# Patient Record
Sex: MALE | Race: OTHER | URBAN - METROPOLITAN AREA
[De-identification: names, ages, dates, MRNs, and addresses within clinical notes are randomized per-mention and may not be internally consistent; named-entity substitution may affect disease eponyms.]

---

## 2021-01-12 ENCOUNTER — IMPORTED ENCOUNTER (OUTPATIENT)
Dept: URBAN - METROPOLITAN AREA CLINIC 50 | Facility: CLINIC | Age: 81
End: 2021-01-12

## 2021-02-01 ENCOUNTER — IMPORTED ENCOUNTER (OUTPATIENT)
Dept: URBAN - METROPOLITAN AREA CLINIC 50 | Facility: CLINIC | Age: 81
End: 2021-02-01

## 2021-02-05 ENCOUNTER — IMPORTED ENCOUNTER (OUTPATIENT)
Dept: URBAN - METROPOLITAN AREA CLINIC 50 | Facility: CLINIC | Age: 81
End: 2021-02-05

## 2021-02-08 ENCOUNTER — IMPORTED ENCOUNTER (OUTPATIENT)
Dept: URBAN - METROPOLITAN AREA CLINIC 50 | Facility: CLINIC | Age: 81
End: 2021-02-08

## 2021-02-15 ENCOUNTER — IMPORTED ENCOUNTER (OUTPATIENT)
Dept: URBAN - METROPOLITAN AREA CLINIC 50 | Facility: CLINIC | Age: 81
End: 2021-02-15

## 2021-04-12 ENCOUNTER — IMPORTED ENCOUNTER (OUTPATIENT)
Dept: URBAN - METROPOLITAN AREA CLINIC 50 | Facility: CLINIC | Age: 81
End: 2021-04-12

## 2021-04-17 ASSESSMENT — VISUAL ACUITY
OS_CC: J3
OD_SC: 20/30
OD_CC: J3
OD_CC: 20/40
OD_CC: 20/40
OD_CC: 20/40 SLOW

## 2021-04-17 ASSESSMENT — TONOMETRY
OS_IOP_MMHG: 31
OS_IOP_MMHG: 40
OD_IOP_MMHG: 15
OD_IOP_MMHG: 14
OS_IOP_MMHG: 29
OS_IOP_MMHG: 23
OS_IOP_MMHG: 24
OD_IOP_MMHG: 14
OS_IOP_MMHG: 27
OD_IOP_MMHG: 13
OS_IOP_MMHG: 37
OD_IOP_MMHG: 14
OS_IOP_MMHG: 28
OD_IOP_MMHG: 16
OD_IOP_MMHG: 14
OD_IOP_MMHG: 15
OS_IOP_MMHG: 30
OS_IOP_MMHG: 39
OS_IOP_MMHG: 38
OD_IOP_MMHG: 12
OD_IOP_MMHG: 13
OS_IOP_MMHG: 27

## 2021-04-17 ASSESSMENT — PACHYMETRY
OS_CT_UM: 557
OS_CT_UM: 557
OD_CT_UM: 562
OS_CT_UM: 557
OD_CT_UM: 562
OS_CT_UM: 557
OD_CT_UM: 562
OS_CT_UM: 557
OD_CT_UM: 562
OS_CT_UM: 557
OD_CT_UM: 562
OS_CT_UM: 557
OD_CT_UM: 562

## 2021-11-02 ENCOUNTER — PREPPED CHART (OUTPATIENT)
Dept: URBAN - METROPOLITAN AREA CLINIC 52 | Facility: CLINIC | Age: 81
End: 2021-11-02

## 2021-11-08 ENCOUNTER — 6 MONTH FOLLOW-UP (OUTPATIENT)
Dept: URBAN - METROPOLITAN AREA CLINIC 52 | Facility: CLINIC | Age: 81
End: 2021-11-08

## 2021-11-08 DIAGNOSIS — B02.32: ICD-10-CM

## 2021-11-08 DIAGNOSIS — H40.1122: ICD-10-CM

## 2021-11-08 PROCEDURE — 92012 INTRM OPH EXAM EST PATIENT: CPT

## 2021-11-08 RX ORDER — LATANOPROST 50 UG/ML
1 SOLUTION/ DROPS OPHTHALMIC EVERY EVENING
Start: 2021-11-08

## 2021-11-08 RX ORDER — KETOROLAC TROMETHAMINE 4 MG/ML: 1 SOLUTION/ DROPS OPHTHALMIC

## 2021-11-08 ASSESSMENT — TONOMETRY
OD_IOP_MMHG: 15
OS_IOP_MMHG: 38
OD_IOP_MMHG: 16
OS_IOP_MMHG: 37

## 2021-11-08 ASSESSMENT — VISUAL ACUITY: OD_CC: 20/40-2

## 2021-11-08 NOTE — PATIENT DISCUSSION
Recommend patient stop rocklatan to see if it is causing inflammation.  start latanoprost at bedtime left eye,  continue dorzolamide-timolol twice a day in the left eye.  will recheck in 2 weeks.

## 2021-11-22 ENCOUNTER — 2 WEEK FOLLOW-UP (OUTPATIENT)
Dept: URBAN - METROPOLITAN AREA CLINIC 52 | Facility: CLINIC | Age: 81
End: 2021-11-22

## 2021-11-22 DIAGNOSIS — H40.1122: ICD-10-CM

## 2021-11-22 DIAGNOSIS — H40.042: ICD-10-CM

## 2021-11-22 PROCEDURE — 92012 INTRM OPH EXAM EST PATIENT: CPT

## 2021-11-22 PROCEDURE — 92020 GONIOSCOPY: CPT

## 2021-11-22 RX ORDER — BRIMONIDINE TARTRATE 2 MG/MG
1 SOLUTION/ DROPS OPHTHALMIC
Start: 2021-11-22

## 2021-11-22 RX ORDER — ACETAZOLAMIDE 250 MG/1
1 TABLET ORAL
Start: 2021-11-22

## 2021-11-22 RX ORDER — KETOROLAC TROMETHAMINE 4 MG/ML
1 SOLUTION/ DROPS OPHTHALMIC ONCE A DAY
Start: 2021-11-22

## 2021-11-22 ASSESSMENT — VISUAL ACUITY: OD_CC: 20/40+2

## 2021-11-22 ASSESSMENT — TONOMETRY
OD_IOP_MMHG: 12
OS_IOP_MMHG: 30
OD_IOP_MMHG: 13
OS_IOP_MMHG: 29

## 2021-11-22 NOTE — PATIENT DISCUSSION
IOP above target range. IOP 30, OS. Patient is NLP OS today. Rechecked by Dr. Asher Ames in exam room. Patient denies any pain. Slight irritation from the Latanoprost. Advised patient to continue Latanoprost and Dorzolamide/Timolol. Will start patient on Brimonidine 0.2% 1gtt TID OS. Will also start patient on Acetazolamide 250mg TID by mouth. Patient to decrease Ketorolac to 1gtt QD OS. Will send letter to [facility]. Patient previously seen Dr. Macario Pineda at the beginning of the year.  Will see patient back in one week for IOP check.

## 2021-11-29 ENCOUNTER — 1 WEEK FOLLOW-UP (OUTPATIENT)
Dept: URBAN - METROPOLITAN AREA CLINIC 52 | Facility: CLINIC | Age: 81
End: 2021-11-29

## 2021-11-29 DIAGNOSIS — H40.1122: ICD-10-CM

## 2021-11-29 DIAGNOSIS — H40.042: ICD-10-CM

## 2021-11-29 DIAGNOSIS — B02.32: ICD-10-CM

## 2021-11-29 DIAGNOSIS — H53.40: ICD-10-CM

## 2021-11-29 PROCEDURE — 92012 INTRM OPH EXAM EST PATIENT: CPT

## 2021-11-29 ASSESSMENT — TONOMETRY
OD_IOP_MMHG: 10
OD_IOP_MMHG: 11

## 2021-11-29 ASSESSMENT — VISUAL ACUITY: OD_CC: 20/30-1

## 2021-11-29 NOTE — PATIENT DISCUSSION
IOP is below target range. IOP 06, OS. Patient is NLP OS today. Rechecked by Dr. Lyudmila Duncan in exam room. Patient denies any pain. Advised patient to continue Latanoprost and Dorzolamide/Timolol,  stop Brimonidine 0.2% 1gtt TID OS., Stop  Acetazolamide 250mg TID by mouth. Patient to continue Ketorolac to 1gtt QD OS.  Patient previously seen Dr. Nica Durant at the beginning of the year.  will recheck iop in 1 week.

## 2021-11-29 NOTE — PATIENT DISCUSSION
Patient had an episode of a jerking left hand very pronounced this morning.  that has since subsided.

## 2021-11-29 NOTE — PATIENT DISCUSSION
based on NLP, very low iop,  extreme hand shaking that subsided this has subsided it is recommended that patient have MRI Head.  order given to patient.

## 2021-11-29 NOTE — PATIENT DISCUSSION
It is recommended patient have an MRI head in view of APD and low iop and jerking hand movements .  visual acuity did not improve with lowering iop.

## 2021-11-29 NOTE — PATIENT DISCUSSION
The patient is a steroid responder. Steroid risks emphasized to patient today. steroid response has worn off.

## 2021-12-06 ENCOUNTER — FOLLOW UP (OUTPATIENT)
Dept: URBAN - METROPOLITAN AREA CLINIC 52 | Facility: CLINIC | Age: 81
End: 2021-12-06

## 2021-12-06 DIAGNOSIS — H40.1122: ICD-10-CM

## 2021-12-06 DIAGNOSIS — H53.40: ICD-10-CM

## 2021-12-06 DIAGNOSIS — H40.042: ICD-10-CM

## 2021-12-06 DIAGNOSIS — B02.32: ICD-10-CM

## 2021-12-06 PROCEDURE — 92012 INTRM OPH EXAM EST PATIENT: CPT

## 2021-12-06 RX ORDER — BRIMONIDINE TARTRATE 2 MG/MG: 1 SOLUTION/ DROPS OPHTHALMIC

## 2021-12-06 ASSESSMENT — TONOMETRY
OD_IOP_MMHG: 15
OD_IOP_MMHG: 14
OS_IOP_MMHG: 25
OS_IOP_MMHG: 26

## 2021-12-06 ASSESSMENT — VISUAL ACUITY: OD_CC: 20/30+

## 2021-12-06 NOTE — PATIENT DISCUSSION
Patient had an episode of a jerking left hand very pronounced the morning of the last visit.  that has since subsided.

## 2021-12-06 NOTE — PATIENT DISCUSSION
IOP is above target range. IOP 26, OS. Patient is still NLP OS today. Rechecked by Dr. Milton Manning in exam room. Patient denies any pain. Advised patient to continue Latanoprost and Dorzolamide/Timolol,   still hold Acetazolamide 250mg TID by mouth secondary to heavy side effects.  Extensive discussion with patient and wife regarding treatment possibilities with referral to Dr Kade Cole or adding brimonidine back into treatment plan.  Patient and spouse have decided not to take the referral to Dr Kade Cole or additional drop therapy at this time secondary to no pain or vision at this time.   Patient to continue Ketorolac to 1gtt QD OS.  Patient previously seen Dr. Kade Cole at the beginning of the year. Discussed with patient to call if any pain or changes in symptoms occur.

## 2022-03-14 ENCOUNTER — FOLLOW UP (OUTPATIENT)
Dept: URBAN - METROPOLITAN AREA CLINIC 52 | Facility: CLINIC | Age: 82
End: 2022-03-14

## 2022-03-14 DIAGNOSIS — H40.1122: ICD-10-CM

## 2022-03-14 PROCEDURE — 92012 INTRM OPH EXAM EST PATIENT: CPT

## 2022-03-14 ASSESSMENT — TONOMETRY
OS_IOP_MMHG: 28
OD_IOP_MMHG: 15
OS_IOP_MMHG: 27
OD_IOP_MMHG: 16

## 2022-03-14 ASSESSMENT — VISUAL ACUITY: OD_CC: 20/30

## 2022-03-14 NOTE — PATIENT DISCUSSION
IOP is above target range. IOP 26, OS. Patient is still NLP OS today. Rechecked by Dr. John Luke in exam room. Patient denies any pain. Advised patient to continue Latanoprost and Dorzolamide/Timolol,   still hold Acetazolamide 250mg TID by mouth secondary to heavy side effects.  Extensive discussion with patient and wife regarding treatment possibilities with referral to Dr Kayli Berumen or adding brimonidine back into treatment plan.  Patient and spouse have decided not to take the referral to Dr Kayli Berumen or additional drop therapy at this time secondary to no pain or vision at this time.   Patient to continue Ketorolac to 1gtt QD OS.  Patient previously seen Dr. Kayli Berumen at the beginning of the year. Discussed with patient to call if any pain or changes in symptoms occur.

## 2022-11-14 ENCOUNTER — FOLLOW UP (OUTPATIENT)
Dept: URBAN - METROPOLITAN AREA CLINIC 52 | Facility: CLINIC | Age: 82
End: 2022-11-14

## 2022-11-14 DIAGNOSIS — H53.40: ICD-10-CM

## 2022-11-14 DIAGNOSIS — B02.32: ICD-10-CM

## 2022-11-14 DIAGNOSIS — H40.1122: ICD-10-CM

## 2022-11-14 PROCEDURE — 92012 INTRM OPH EXAM EST PATIENT: CPT

## 2022-11-14 ASSESSMENT — TONOMETRY
OS_IOP_MMHG: 28
OS_IOP_MMHG: 28
OD_IOP_MMHG: 11
OD_IOP_MMHG: 10

## 2022-11-14 ASSESSMENT — VISUAL ACUITY: OD_CC: 20/40

## 2022-11-14 NOTE — PATIENT DISCUSSION
IOP is above target range. IOP 28 OS. Patient is still NLP OS today. Patient denies any pain. Discussed with patient continue Latanoprost as preventive to prevent pain. with  Advised patient to continue Latanoprost and Dorzolamide/Timolol,   still hold Acetazolamide 250mg TID by mouth secondary to heavy side effects.  Patient to continue Ketorolac to 1gtt QD OS.  Patient previously seen Dr. Mahsa Augustine at the beginning of the year. Discussed with patient to call if any pain or changes in symptoms occur. RTC in 6 months for.

## 2023-04-25 ENCOUNTER — COMPREHENSIVE EXAM (OUTPATIENT)
Dept: URBAN - METROPOLITAN AREA CLINIC 53 | Facility: CLINIC | Age: 83
End: 2023-04-25

## 2023-04-25 DIAGNOSIS — H40.1122: ICD-10-CM

## 2023-04-25 DIAGNOSIS — H26.491: ICD-10-CM

## 2023-04-25 DIAGNOSIS — H35.361: ICD-10-CM

## 2023-04-25 DIAGNOSIS — H35.372: ICD-10-CM

## 2023-04-25 DIAGNOSIS — S05.02XA: ICD-10-CM

## 2023-04-25 PROCEDURE — 92014 COMPRE OPH EXAM EST PT 1/>: CPT

## 2023-04-25 RX ORDER — ERYTHROMYCIN 5 MG/G: OINTMENT OPHTHALMIC EVERY EVENING

## 2023-04-25 ASSESSMENT — TONOMETRY
OS_IOP_MMHG: 28
OS_IOP_MMHG: 27
OD_IOP_MMHG: 15
OD_IOP_MMHG: 14

## 2023-04-25 ASSESSMENT — VISUAL ACUITY
OD_PH: 20/30
OU_CC: J1+
OD_CC: 20/30

## 2024-01-22 ENCOUNTER — FOLLOW UP (OUTPATIENT)
Dept: URBAN - METROPOLITAN AREA CLINIC 52 | Facility: CLINIC | Age: 84
End: 2024-01-22

## 2024-01-22 DIAGNOSIS — H40.1122: ICD-10-CM

## 2024-01-22 DIAGNOSIS — H26.491: ICD-10-CM

## 2024-01-22 DIAGNOSIS — H47.012: ICD-10-CM

## 2024-01-22 PROCEDURE — 99213 OFFICE O/P EST LOW 20 MIN: CPT

## 2024-01-22 ASSESSMENT — VISUAL ACUITY
OD_CC: 20/40-1
OD_PH: 20/30-2

## 2024-01-22 ASSESSMENT — TONOMETRY
OD_IOP_MMHG: 12
OS_IOP_MMHG: 17
OD_IOP_MMHG: 11
OS_IOP_MMHG: 18

## 2024-09-23 ENCOUNTER — DASHBOARD ENCOUNTERS (OUTPATIENT)
Age: 84
End: 2024-09-23

## 2024-09-23 ENCOUNTER — LAB OUTSIDE AN ENCOUNTER (OUTPATIENT)
Dept: RURAL CLINIC 2 | Facility: CLINIC | Age: 84
End: 2024-09-23

## 2024-09-23 ENCOUNTER — OFFICE VISIT (OUTPATIENT)
Dept: RURAL CLINIC 2 | Facility: CLINIC | Age: 84
End: 2024-09-23
Payer: COMMERCIAL

## 2024-09-23 ENCOUNTER — TELEPHONE ENCOUNTER (OUTPATIENT)
Dept: RURAL CLINIC 2 | Facility: CLINIC | Age: 84
End: 2024-09-23

## 2024-09-23 VITALS
HEART RATE: 73 BPM | DIASTOLIC BLOOD PRESSURE: 68 MMHG | HEIGHT: 71 IN | TEMPERATURE: 97.1 F | SYSTOLIC BLOOD PRESSURE: 126 MMHG | WEIGHT: 227 LBS | BODY MASS INDEX: 31.78 KG/M2

## 2024-09-23 DIAGNOSIS — I25.10 CORONARY ARTERY DISEASE INVOLVING NATIVE CORONARY ARTERY OF NATIVE HEART WITHOUT ANGINA PECTORIS: ICD-10-CM

## 2024-09-23 DIAGNOSIS — K80.21 CALCULUS OF GALLBLADDER WITH BILIARY OBSTRUCTION BUT WITHOUT CHOLECYSTITIS: ICD-10-CM

## 2024-09-23 DIAGNOSIS — K80.50: ICD-10-CM

## 2024-09-23 DIAGNOSIS — Z79.02 ENCOUNTER FOR CURRENT LONG TERM USE OF ANTIPLATELET DRUG: ICD-10-CM

## 2024-09-23 DIAGNOSIS — R79.89 ELEVATED LFTS: ICD-10-CM

## 2024-09-23 DIAGNOSIS — Z95.5 H/O HEART ARTERY STENT: ICD-10-CM

## 2024-09-23 PROBLEM — 863927004: Status: ACTIVE | Noted: 2024-09-23

## 2024-09-23 PROBLEM — 77528005: Status: ACTIVE | Noted: 2024-09-23

## 2024-09-23 PROBLEM — 428375006: Status: ACTIVE | Noted: 2024-09-23

## 2024-09-23 PROBLEM — 735734002: Status: ACTIVE | Noted: 2024-09-23

## 2024-09-23 PROBLEM — 53741008: Status: ACTIVE | Noted: 2024-09-23

## 2024-09-23 PROCEDURE — 99204 OFFICE O/P NEW MOD 45 MIN: CPT | Performed by: INTERNAL MEDICINE

## 2024-09-23 RX ORDER — FINASTERIDE 5 MG/1
TABLET, FILM COATED ORAL
Qty: 90 TABLET | Status: ACTIVE | COMMUNITY

## 2024-09-23 RX ORDER — MEMANTINE HYDROCHLORIDE 10 MG/1
1 TABLET TABLET ORAL ONCE A DAY
Status: ACTIVE | COMMUNITY

## 2024-09-23 RX ORDER — PREGABALIN 75 MG/1
CAPSULE ORAL
Qty: 90 EACH | Refills: 0 | Status: ACTIVE | COMMUNITY

## 2024-09-23 RX ORDER — DONEPEZIL HYDROCHLORIDE 10 MG/1
TABLET, FILM COATED ORAL
Qty: 90 TABLET | Status: ACTIVE | COMMUNITY

## 2024-09-23 RX ORDER — CLOPIDOGREL BISULFATE 75 MG/1
TABLET, FILM COATED ORAL
Qty: 90 TABLET | Status: ACTIVE | COMMUNITY

## 2024-09-23 RX ORDER — AZELASTINE HYDROCHLORIDE 137 UG/1
SPRAY, METERED NASAL
Qty: 90 MILLILITER | Status: ACTIVE | COMMUNITY

## 2024-09-23 RX ORDER — METOPROLOL TARTRATE 50 MG/1
1 TABLET WITH FOOD TABLET, FILM COATED ORAL TWICE A DAY
Status: ACTIVE | COMMUNITY

## 2024-09-23 RX ORDER — LOSARTAN POTASSIUM 50 MG/1
TABLET, FILM COATED ORAL
Qty: 180 TABLET | Status: ACTIVE | COMMUNITY

## 2024-09-23 RX ORDER — ATORVASTATIN CALCIUM 40 MG/1
1 TABLET TABLET, FILM COATED ORAL ONCE A DAY
Status: ACTIVE | COMMUNITY

## 2024-09-23 RX ORDER — VENLAFAXINE HYDROCHLORIDE 75 MG/1
1 TABLET WITH FOOD TABLET ORAL ONCE A DAY
Status: ACTIVE | COMMUNITY

## 2024-09-23 RX ORDER — IPRATROPIUM BROMIDE 21 UG/1
SPRAY, METERED NASAL
Qty: 120 MILLILITER | Status: ACTIVE | COMMUNITY

## 2024-09-23 RX ORDER — DAPAGLIFLOZIN 10 MG/1
1 TABLET TABLET, FILM COATED ORAL ONCE A DAY
Status: ACTIVE | COMMUNITY

## 2024-09-23 RX ORDER — TERAZOSIN 10 MG/1
CAPSULE ORAL
Qty: 90 CAPSULE | Status: ACTIVE | COMMUNITY

## 2024-09-23 RX ORDER — KETOROLAC TROMETHAMINE 4 MG/ML
SOLUTION/ DROPS OPHTHALMIC
Qty: 10 MILLILITER | Status: ACTIVE | COMMUNITY

## 2024-09-23 NOTE — HPI-ZZZTODAY'S VISIT
The patient is an 84-year-old gentleman who presents on referral from Dr. Galo Garcia for elevated LFTs and abnormal MRI revealing calculus in bile duct without inflammation of biliary tract.  A copy of this document to be sent to the referring provider.  The patient underwent imaging for elevated liver enzymes showing an alkaline phosphatase level of 211, AST 87 and .  Bilirubin level noted to be normal.  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  Signal deficit at the distal duct suggestive of choledocholithiasis, measuring 2 to 3 mm.  The patient offers no complaints of abdominal pain, weight loss or jaundice.  Past medical history is significant for coronary artery disease, heart stent placement, lower extremity vascular stent placement, on antiplatelet therapy with Plavix.

## 2024-10-17 ENCOUNTER — TELEPHONE ENCOUNTER (OUTPATIENT)
Dept: RURAL CLINIC 2 | Facility: CLINIC | Age: 84
End: 2024-10-17

## 2024-10-17 ENCOUNTER — TELEPHONE ENCOUNTER (OUTPATIENT)
Dept: URBAN - METROPOLITAN AREA CLINIC 19 | Facility: CLINIC | Age: 84
End: 2024-10-17

## 2024-10-21 ENCOUNTER — TELEPHONE ENCOUNTER (OUTPATIENT)
Dept: URBAN - METROPOLITAN AREA CLINIC 19 | Facility: CLINIC | Age: 84
End: 2024-10-21

## 2024-10-23 ENCOUNTER — OFFICE VISIT (OUTPATIENT)
Dept: URBAN - METROPOLITAN AREA MEDICAL CENTER 25 | Facility: MEDICAL CENTER | Age: 84
End: 2024-10-23

## 2024-10-23 RX ORDER — TERAZOSIN 10 MG/1
CAPSULE ORAL
Qty: 90 CAPSULE | Status: ACTIVE | COMMUNITY

## 2024-10-23 RX ORDER — DAPAGLIFLOZIN 10 MG/1
1 TABLET TABLET, FILM COATED ORAL ONCE A DAY
Status: ACTIVE | COMMUNITY

## 2024-10-23 RX ORDER — DONEPEZIL HYDROCHLORIDE 10 MG/1
TABLET, FILM COATED ORAL
Qty: 90 TABLET | Status: ACTIVE | COMMUNITY

## 2024-10-23 RX ORDER — ATORVASTATIN CALCIUM 40 MG/1
1 TABLET TABLET, FILM COATED ORAL ONCE A DAY
Status: ACTIVE | COMMUNITY

## 2024-10-23 RX ORDER — VENLAFAXINE HYDROCHLORIDE 75 MG/1
1 TABLET WITH FOOD TABLET ORAL ONCE A DAY
Status: ACTIVE | COMMUNITY

## 2024-10-23 RX ORDER — AZELASTINE HYDROCHLORIDE 137 UG/1
SPRAY, METERED NASAL
Qty: 90 MILLILITER | Status: ACTIVE | COMMUNITY

## 2024-10-23 RX ORDER — KETOROLAC TROMETHAMINE 4 MG/ML
SOLUTION/ DROPS OPHTHALMIC
Qty: 10 MILLILITER | Status: ACTIVE | COMMUNITY

## 2024-10-23 RX ORDER — PREGABALIN 75 MG/1
CAPSULE ORAL
Qty: 90 EACH | Refills: 0 | Status: ACTIVE | COMMUNITY

## 2024-10-23 RX ORDER — CLOPIDOGREL BISULFATE 75 MG/1
TABLET, FILM COATED ORAL
Qty: 90 TABLET | Status: ACTIVE | COMMUNITY

## 2024-10-23 RX ORDER — MEMANTINE HYDROCHLORIDE 10 MG/1
1 TABLET TABLET ORAL ONCE A DAY
Status: ACTIVE | COMMUNITY

## 2024-10-23 RX ORDER — LOSARTAN POTASSIUM 50 MG/1
TABLET, FILM COATED ORAL
Qty: 180 TABLET | Status: ACTIVE | COMMUNITY

## 2024-10-23 RX ORDER — METOPROLOL TARTRATE 50 MG/1
1 TABLET WITH FOOD TABLET, FILM COATED ORAL TWICE A DAY
Status: ACTIVE | COMMUNITY

## 2024-10-23 RX ORDER — IPRATROPIUM BROMIDE 21 UG/1
SPRAY, METERED NASAL
Qty: 120 MILLILITER | Status: ACTIVE | COMMUNITY

## 2024-10-23 RX ORDER — FINASTERIDE 5 MG/1
TABLET, FILM COATED ORAL
Qty: 90 TABLET | Status: ACTIVE | COMMUNITY

## 2024-10-24 ENCOUNTER — OFFICE VISIT (OUTPATIENT)
Dept: URBAN - METROPOLITAN AREA MEDICAL CENTER 28 | Facility: MEDICAL CENTER | Age: 84
End: 2024-10-24

## 2024-12-02 ENCOUNTER — COMPREHENSIVE EXAM (OUTPATIENT)
Age: 84
End: 2024-12-02

## 2024-12-02 DIAGNOSIS — H35.372: ICD-10-CM

## 2024-12-02 DIAGNOSIS — H16.232: ICD-10-CM

## 2024-12-02 DIAGNOSIS — H47.012: ICD-10-CM

## 2024-12-02 DIAGNOSIS — H40.1122: ICD-10-CM

## 2024-12-02 DIAGNOSIS — S05.02XA: ICD-10-CM

## 2024-12-02 DIAGNOSIS — H26.491: ICD-10-CM

## 2024-12-02 DIAGNOSIS — H35.361: ICD-10-CM

## 2024-12-02 PROCEDURE — 65778PS PLACE AMNIOTIC MEMB/PROKERA SLIM

## 2024-12-02 PROCEDURE — 92134 CPTRZ OPH DX IMG PST SGM RTA: CPT

## 2024-12-02 PROCEDURE — 99214 OFFICE O/P EST MOD 30 MIN: CPT | Mod: 25

## 2024-12-09 ENCOUNTER — FOLLOW UP (OUTPATIENT)
Age: 84
End: 2024-12-09

## 2024-12-09 DIAGNOSIS — H16.232: ICD-10-CM

## 2024-12-09 DIAGNOSIS — S05.02XA: ICD-10-CM

## 2024-12-09 DIAGNOSIS — H40.1122: ICD-10-CM

## 2024-12-09 DIAGNOSIS — H47.012: ICD-10-CM

## 2024-12-09 PROCEDURE — 99213 OFFICE O/P EST LOW 20 MIN: CPT

## 2024-12-09 RX ORDER — POLYETHYLENE GLYCOL 400 2.5 MG/ML
1 SOLUTION/ DROPS OPHTHALMIC
Start: 2024-12-09